# Patient Record
Sex: MALE | Race: WHITE | NOT HISPANIC OR LATINO | URBAN - METROPOLITAN AREA
[De-identification: names, ages, dates, MRNs, and addresses within clinical notes are randomized per-mention and may not be internally consistent; named-entity substitution may affect disease eponyms.]

---

## 2015-12-14 LAB — CRC RECOMMENDATION EXT: NORMAL

## 2022-04-07 ENCOUNTER — HISTORICAL (OUTPATIENT)
Dept: ADMINISTRATIVE | Facility: HOSPITAL | Age: 57
End: 2022-04-07

## 2022-04-24 VITALS
OXYGEN SATURATION: 98 % | HEIGHT: 70 IN | DIASTOLIC BLOOD PRESSURE: 68 MMHG | WEIGHT: 222.88 LBS | BODY MASS INDEX: 31.91 KG/M2 | SYSTOLIC BLOOD PRESSURE: 107 MMHG

## 2023-11-14 PROBLEM — F41.9 ANXIETY: Status: ACTIVE | Noted: 2023-11-14

## 2023-11-14 PROBLEM — I10 HYPERTENSION: Status: ACTIVE | Noted: 2023-11-14

## 2023-11-14 PROBLEM — I10 PRIMARY HYPERTENSION: Chronic | Status: ACTIVE | Noted: 2023-11-14

## 2023-11-14 PROBLEM — R73.03 PREDIABETES: Chronic | Status: ACTIVE | Noted: 2023-11-14

## 2023-11-14 PROBLEM — E66.9 OBESITY: Chronic | Status: ACTIVE | Noted: 2023-11-14

## 2023-11-14 PROBLEM — J30.9 CHRONIC ALLERGIC RHINITIS: Status: ACTIVE | Noted: 2017-10-26

## 2023-11-14 PROBLEM — E78.5 HYPERLIPIDEMIA: Status: ACTIVE | Noted: 2023-11-14

## 2023-11-14 PROBLEM — E66.9 OBESITY: Status: ACTIVE | Noted: 2023-11-14

## 2023-11-14 PROBLEM — G47.9 SLEEP DISORDER: Status: ACTIVE | Noted: 2023-11-14

## 2023-11-14 PROBLEM — E78.5 DYSLIPIDEMIA: Chronic | Status: ACTIVE | Noted: 2023-11-14

## 2023-11-14 NOTE — PROGRESS NOTES
"Subjective:       Patient ID: Prabhakar Duncan is a 57 y.o. male.    Chief Complaint: Establish Care    Patient presents to the clinic to establish primary care.  Past medical, family, and social history is reviewed, as well as all chronic conditions, and medications prescribed to treat those conditions.        Review of Systems   Constitutional: Negative.  Negative for fatigue and fever.   HENT: Negative.  Negative for sore throat and trouble swallowing.    Eyes: Negative.  Negative for redness and visual disturbance.   Respiratory: Negative.  Negative for chest tightness and shortness of breath.    Cardiovascular: Negative.  Negative for chest pain.   Gastrointestinal: Negative.  Negative for abdominal pain, blood in stool and nausea.   Endocrine: Negative.  Negative for cold intolerance, heat intolerance and polyuria.   Genitourinary: Negative.    Musculoskeletal: Negative.  Negative for arthralgias, gait problem and joint swelling.   Integumentary:  Negative for rash. Negative.   Neurological: Negative.  Negative for dizziness, weakness and headaches.   Psychiatric/Behavioral: Negative.  Negative for agitation and dysphoric mood.            Patient Care Team:  Yohannes Hudson MD as PCP - General (Family Medicine)  Silvestre Morgan MD as Consulting Physician (Otolaryngology)  Objective:   Visit Vitals  /78   Pulse 74   Resp 18   Ht 5' 10" (1.778 m)   Wt 100.7 kg (222 lb)   SpO2 99%   BMI 31.85 kg/m²        Physical Exam  Constitutional:       Appearance: Normal appearance.   HENT:      Head: Normocephalic.      Mouth/Throat:      Mouth: Mucous membranes are moist.      Pharynx: Oropharynx is clear.   Eyes:      Conjunctiva/sclera: Conjunctivae normal.      Pupils: Pupils are equal, round, and reactive to light.   Cardiovascular:      Rate and Rhythm: Normal rate and regular rhythm.      Heart sounds: Normal heart sounds.   Pulmonary:      Effort: Pulmonary effort is normal.      Breath sounds: Normal " breath sounds.   Abdominal:      General: Abdomen is flat.      Palpations: Abdomen is soft.   Musculoskeletal:         General: Normal range of motion.      Cervical back: Neck supple.   Skin:     General: Skin is warm and dry.   Neurological:      General: No focal deficit present.      Mental Status: He is alert and oriented to person, place, and time.   Psychiatric:         Mood and Affect: Mood normal.         Behavior: Behavior normal.         Thought Content: Thought content normal.         Judgment: Judgment normal.         Lab Results   Component Value Date    WBC 6.5 06/13/2023    HGB 14.2 06/13/2023    HCT 42.1 06/13/2023    RDW 12.1 06/13/2023     06/13/2023      Lab Results   Component Value Date    CHOL 138 06/13/2023    TRIG 56 06/13/2023    HDL 48 06/13/2023     Lab Results   Component Value Date    TSH 1.670 06/13/2023     Lab Results   Component Value Date    HGBA1C 6.0 (H) 06/13/2023        Assessment:       ICD-10-CM ICD-9-CM   1. Establishing care with new doctor, encounter for  Z76.89 V65.8   2. Prediabetes  R73.03 790.29   3. Primary hypertension  I10 401.9   4. Dyslipidemia  E78.5 272.4   5. Moderate persistent extrinsic asthma without complication  J45.40 493.00   6. Primary insomnia  F51.01 307.42   7. TUAN (generalized anxiety disorder)  F41.1 300.02   8. Gastroesophageal reflux disease without esophagitis  K21.9 530.81   9. Class 1 obesity due to excess calories with serious comorbidity and body mass index (BMI) of 31.0 to 31.9 in adult  E66.09 278.00    Z68.31 V85.31   10. Wellness examination  Z00.00 V70.0   11. Screening for prostate cancer  Z12.5 V76.44       Current Outpatient Medications   Medication Instructions    amlodipine-benazepril 10-20mg (LOTREL) 10-20 mg per capsule 1 capsule, Oral, Daily    azelastine (ASTELIN) 137 mcg (0.1 %) nasal spray 1 spray, Nasal, Daily    cholecalciferol, vitamin D3, (VITAMIN D3) 25 mcg (1,000 unit) capsule 1 capsule, Oral, Every morning     DULoxetine (CYMBALTA) 60 mg, Oral, Daily    EPIPEN 0.3 mg/0.3 mL AtIn Intramuscular, Once    esomeprazole (NEXIUM) 20 mg, Oral, Before breakfast    fexofenadine (ALLEGRA) 180 MG tablet 1 tablet, Oral, Daily    lovastatin (MEVACOR) 20 mg, Oral, Daily    multivit-minerals/folic acid (CENTRUM ADULT 50 PLUS ORAL) Oral    SYMBICORT 80-4.5 mcg/actuation HFAA 2 puffs, Inhalation, 2 times daily    traZODone (DESYREL) 100 MG tablet 1 tablet, Oral, Nightly    VENTOLIN HFA 90 mcg/actuation inhaler 2 puffs, Inhalation, Every 6 hours PRN     Plan:     Problem List Items Addressed This Visit          Psychiatric    TUAN (generalized anxiety disorder) (Chronic)    Overview     Prescribed Cymbalta 60 mg daily.         Current Assessment & Plan     Patient reports stability of moods, and effectiveness of medications, including no agitation, significant somnolence, or significant bouts of anxiety or depression.  Patient is not having any sleep disturbance.  Current treatment plan will continue, with plans to discuss any significant changes in patient's status if necessary if anything changes in the future.            Pulmonary    Extrinsic asthma without complication (Chronic)    Overview     Prescribed Symbicort 80-4.5 mcg two puffs b.i.d. and albuterol MDI.         Current Assessment & Plan     This medical condition is currently stable on prescription medication, continue current treatment plan.            Cardiac/Vascular    Primary hypertension (Chronic)    Overview     Prescribed amlodipine benazepril 10-20 mg daily.         Current Assessment & Plan     Blood pressures appear to be adequately controlled with current treatment plan, including prescription blood pressure medication. Continue medical management and continue home blood pressure checks.  Blood pressure should be checked as discussed during medical visits, and average blood pressure should be no greater than 130/80.         Dyslipidemia (Chronic)    Overview      "Prescribed lovastatin 20 mg daily.         Current Assessment & Plan             Component Ref Range & Units 5 mo ago  (6/13/23) 11 mo ago  (12/7/22) 2 yr ago  (7/28/21) 2 yr ago  (1/27/21)   Cholesterol 100 - 199 mg/dL 138 163 149 135   Triglycerides 0 - 149 mg/dL 56 102 91 69   HDL >39 mg/dL 48 45 46 52   VLDL Cholesterol Jeffrey 5 - 40 mg/dL 12 19 17 14   LDL Calculated 0 - 99 mg/dL 78 99 86 69      Most recent cholesterol/lipid blood testing shows adequate control, continue current treatment plan, including prescription medications if prescribed, and/or diet high in fiber, low in saturated fats as discussed during clinic visit.            Endocrine    Class 1 obesity due to excess calories with serious comorbidity and body mass index (BMI) of 31.0 to 31.9 in adult (Chronic)    Overview     Weight loss, healthy dietary choices, increased activity/exercise are recommended.  To lose weight, it is generally recommended to restrict calories to approximately 1500 calories per day to lose 1 lb per week, and approximately 1200 calories per day to lose up to 2 lb per week.  Generally, this is a healthy amount of weight to lose, and an appropriate amount of time to lose this amount of weight.  Adding exercise will assist in losing weight, particularly aerobic exercise such as walking.  However, resistance training, or lifting weights" over time may assistant weight loss by increasing basal metabolic rate, or the rate at which your body burns calories during periods of inactivity.    Keep track of BMI (body mass index), below 25 is considered normal, over 25 but below 30 is considered overweight, anything over 30 is considered moderately obese, over 35 severely obese, and over 40 is characterized as morbidly obese.         Prediabetes (Chronic)    Overview     Not prescribed any medication for prediabetes.         Current Assessment & Plan     Most recent A1c June 2023, 6.0%.    Current average blood sugar falls into a range " that is determined to be prediabetes.  In other words, if your blood sugar goes any higher, you could become diabetic.  Therefore, it is essential that we start a treatment plan that decreases your average blood sugar.  Follow a healthy meal plan.  This includes eating lean proteins, complex carbohydrates in moderation (rice, bread, pasta, potatoes), fresh fruits and vegetables, low-fat dairy products and healthy fats such as avocado, olive, canola or vegetable oil.  Simple carbohydrates such as sweets, containing sugar should be avoided or consumed in controlled amounts.    Physical activity as recommended, 30 more minutes up to 5 days a week.  This could be something as simple as brisk walking or biking.    Weight loss is also beneficial and may reverse diabetes or prediabetes.            GI    Gastroesophageal reflux disease without esophagitis (Chronic)    Overview     Prescribed Nexium 40 mg daily.         Current Assessment & Plan     Symptoms of GERD have been modified and controlled using a PPI medication.  Patient was advised to continue reflux precautions as discussed in the past, and at some point would be prudent to discuss possible decreased frequency of PPI use or cessation of PPI use altogether.   It is hopeful at some point that the patient can use the medication on an as-needed basis.            Other    Primary insomnia (Chronic)    Overview     Prescribed trazodone 100 mg q.h.s..         Current Assessment & Plan     This medical condition is currently stable on prescription medication, continue current treatment plan.          Other Visit Diagnoses       Establishing care with new doctor, encounter for    -  Primary    Patient's medical care has been established in this clinic.  All issues addressed, all questions answered,  with appropriate scheduling of follow-up care.    Wellness examination        This diagnosis does not apply to this visit, wellness exam with pre visit labs will be  scheduled/ordered for future visit.    Relevant Orders    Comprehensive Metabolic Panel    CBC Auto Differential    Lipid Panel    Hemoglobin A1C    TSH    Hepatitis C Antibody    PSA, Screening    Screening for prostate cancer        This diagnosis does not apply to this visit, appropriate prostate cancer screening will be ordered for next visit/wellness exam.    Relevant Orders    PSA, Screening                    Follow up in about 7 months (around 6/17/2024) for Wellness.

## 2023-11-15 ENCOUNTER — TELEPHONE (OUTPATIENT)
Dept: PRIMARY CARE CLINIC | Facility: CLINIC | Age: 58
End: 2023-11-15

## 2023-11-15 ENCOUNTER — OFFICE VISIT (OUTPATIENT)
Dept: PRIMARY CARE CLINIC | Facility: CLINIC | Age: 58
End: 2023-11-15
Payer: COMMERCIAL

## 2023-11-15 VITALS
HEART RATE: 74 BPM | HEIGHT: 70 IN | SYSTOLIC BLOOD PRESSURE: 130 MMHG | OXYGEN SATURATION: 99 % | WEIGHT: 222 LBS | DIASTOLIC BLOOD PRESSURE: 78 MMHG | RESPIRATION RATE: 18 BRPM | BODY MASS INDEX: 31.78 KG/M2

## 2023-11-15 DIAGNOSIS — Z12.5 SCREENING FOR PROSTATE CANCER: ICD-10-CM

## 2023-11-15 DIAGNOSIS — Z76.89 ESTABLISHING CARE WITH NEW DOCTOR, ENCOUNTER FOR: Primary | ICD-10-CM

## 2023-11-15 DIAGNOSIS — E78.5 DYSLIPIDEMIA: Chronic | ICD-10-CM

## 2023-11-15 DIAGNOSIS — I10 PRIMARY HYPERTENSION: Chronic | ICD-10-CM

## 2023-11-15 DIAGNOSIS — F41.1 GAD (GENERALIZED ANXIETY DISORDER): Chronic | ICD-10-CM

## 2023-11-15 DIAGNOSIS — E66.09 CLASS 1 OBESITY DUE TO EXCESS CALORIES WITH SERIOUS COMORBIDITY AND BODY MASS INDEX (BMI) OF 31.0 TO 31.9 IN ADULT: Chronic | ICD-10-CM

## 2023-11-15 DIAGNOSIS — R73.03 PREDIABETES: Chronic | ICD-10-CM

## 2023-11-15 DIAGNOSIS — K21.9 GASTROESOPHAGEAL REFLUX DISEASE WITHOUT ESOPHAGITIS: Chronic | ICD-10-CM

## 2023-11-15 DIAGNOSIS — F51.01 PRIMARY INSOMNIA: ICD-10-CM

## 2023-11-15 DIAGNOSIS — F51.01 PRIMARY INSOMNIA: Chronic | ICD-10-CM

## 2023-11-15 DIAGNOSIS — J45.40 MODERATE PERSISTENT EXTRINSIC ASTHMA WITHOUT COMPLICATION: Chronic | ICD-10-CM

## 2023-11-15 DIAGNOSIS — E78.5 DYSLIPIDEMIA: Primary | Chronic | ICD-10-CM

## 2023-11-15 DIAGNOSIS — Z00.00 WELLNESS EXAMINATION: ICD-10-CM

## 2023-11-15 PROBLEM — J45.909 EXTRINSIC ASTHMA WITHOUT COMPLICATION: Chronic | Status: ACTIVE | Noted: 2023-11-15

## 2023-11-15 PROBLEM — E66.811 CLASS 1 OBESITY DUE TO EXCESS CALORIES WITH SERIOUS COMORBIDITY AND BODY MASS INDEX (BMI) OF 31.0 TO 31.9 IN ADULT: Chronic | Status: ACTIVE | Noted: 2023-11-14

## 2023-11-15 PROCEDURE — 99204 OFFICE O/P NEW MOD 45 MIN: CPT | Mod: ,,, | Performed by: GENERAL PRACTICE

## 2023-11-15 PROCEDURE — 99204 PR OFFICE/OUTPT VISIT, NEW, LEVL IV, 45-59 MIN: ICD-10-PCS | Mod: ,,, | Performed by: GENERAL PRACTICE

## 2023-11-15 RX ORDER — MINERAL OIL
1 ENEMA (ML) RECTAL DAILY
COMMUNITY
Start: 2023-01-27

## 2023-11-15 RX ORDER — DULOXETIN HYDROCHLORIDE 60 MG/1
60 CAPSULE, DELAYED RELEASE ORAL DAILY
COMMUNITY
End: 2023-11-16 | Stop reason: SDUPTHER

## 2023-11-15 RX ORDER — VIT C/E/ZN/COPPR/LUTEIN/ZEAXAN 250MG-90MG
1 CAPSULE ORAL EVERY MORNING
COMMUNITY
Start: 2023-06-15 | End: 2024-06-14

## 2023-11-15 RX ORDER — HYDROGEN PEROXIDE 3 %
20 SOLUTION, NON-ORAL MISCELLANEOUS
COMMUNITY

## 2023-11-15 RX ORDER — AMLODIPINE AND BENAZEPRIL HYDROCHLORIDE 10; 20 MG/1; MG/1
1 CAPSULE ORAL DAILY
COMMUNITY
Start: 2023-10-26 | End: 2023-11-15 | Stop reason: SDUPTHER

## 2023-11-15 RX ORDER — HYDROGEN PEROXIDE 3 %
20 SOLUTION, NON-ORAL MISCELLANEOUS
Qty: 90 CAPSULE | Refills: 3 | Status: CANCELLED | OUTPATIENT
Start: 2023-11-15

## 2023-11-15 RX ORDER — AZELASTINE 1 MG/ML
1 SPRAY, METERED NASAL DAILY
COMMUNITY
Start: 2023-08-17

## 2023-11-15 RX ORDER — EPINEPHRINE 0.3 MG/.3ML
INJECTION INTRAMUSCULAR ONCE
COMMUNITY
Start: 2023-07-26

## 2023-11-15 RX ORDER — ALBUTEROL SULFATE 90 UG/1
2 AEROSOL, METERED RESPIRATORY (INHALATION) EVERY 6 HOURS PRN
COMMUNITY

## 2023-11-15 RX ORDER — LOVASTATIN 20 MG/1
20 TABLET ORAL DAILY
COMMUNITY
End: 2023-11-16 | Stop reason: SDUPTHER

## 2023-11-15 RX ORDER — TRAZODONE HYDROCHLORIDE 100 MG/1
1 TABLET ORAL NIGHTLY
COMMUNITY
Start: 2023-10-30 | End: 2023-11-15 | Stop reason: SDUPTHER

## 2023-11-15 RX ORDER — BUDESONIDE AND FORMOTEROL FUMARATE DIHYDRATE 80; 4.5 UG/1; UG/1
2 AEROSOL RESPIRATORY (INHALATION) 2 TIMES DAILY
COMMUNITY
Start: 2023-07-26

## 2023-11-15 NOTE — ASSESSMENT & PLAN NOTE
Most recent A1c June 2023, 6.0%.    Current average blood sugar falls into a range that is determined to be prediabetes.  In other words, if your blood sugar goes any higher, you could become diabetic.  Therefore, it is essential that we start a treatment plan that decreases your average blood sugar.  Follow a healthy meal plan.  This includes eating lean proteins, complex carbohydrates in moderation (rice, bread, pasta, potatoes), fresh fruits and vegetables, low-fat dairy products and healthy fats such as avocado, olive, canola or vegetable oil.  Simple carbohydrates such as sweets, containing sugar should be avoided or consumed in controlled amounts.    Physical activity as recommended, 30 more minutes up to 5 days a week.  This could be something as simple as brisk walking or biking.    Weight loss is also beneficial and may reverse diabetes or prediabetes.

## 2023-11-15 NOTE — ASSESSMENT & PLAN NOTE
Symptoms of GERD have been modified and controlled using a PPI medication.  Patient was advised to continue reflux precautions as discussed in the past, and at some point would be prudent to discuss possible decreased frequency of PPI use or cessation of PPI use altogether.   It is hopeful at some point that the patient can use the medication on an as-needed basis.

## 2023-11-15 NOTE — LETTER
AUTHORIZATION FOR RELEASE OF   CONFIDENTIAL INFORMATION    Dear [facility],    We are seeing Prabhakar Duncan, date of birth 1965, in the clinic at [facility]. Yohannes Hudson MD is the patient's PCP. Prabhakar Duncan has an outstanding lab/procedure at the time we reviewed his chart. In order to help keep his health information updated, he has authorized us to request the following medical record(s):        (  )  MAMMOGRAM                                      (X  )  COLONOSCOPY      (  )  PAP SMEAR                                          (  )  OUTSIDE LAB RESULTS     (  )  DEXA SCAN                                          (  )  EYE EXAM            (  )  FOOT EXAM                                          (  )  ENTIRE RECORD     (  )  OUTSIDE IMMUNIZATIONS                 (  )  _______________         Please fax records to [facility], [place], [place], [place], [phone]     If you have any questions, please contact Glenna Gan LPN at [phone].           Patient Name: Prabhakar Duncan  : 1965  Patient Phone #: [phone]

## 2023-11-15 NOTE — ASSESSMENT & PLAN NOTE
Component Ref Range & Units 5 mo ago  (6/13/23) 11 mo ago  (12/7/22) 2 yr ago  (7/28/21) 2 yr ago  (1/27/21)   Cholesterol 100 - 199 mg/dL 138 163 149 135   Triglycerides 0 - 149 mg/dL 56 102 91 69   HDL >39 mg/dL 48 45 46 52   VLDL Cholesterol Jeffrey 5 - 40 mg/dL 12 19 17 14   LDL Calculated 0 - 99 mg/dL 78 99 86 69        Most recent cholesterol/lipid blood testing shows adequate control, continue current treatment plan, including prescription medications if prescribed, and/or diet high in fiber, low in saturated fats as discussed during clinic visit.

## 2023-11-15 NOTE — TELEPHONE ENCOUNTER
Pt stated all medications that Dr Hudson prescribes him needs to be refilled. Pt stated wife will be here for appt 11/16 and you can give scripts to her.

## 2023-11-16 RX ORDER — DULOXETIN HYDROCHLORIDE 60 MG/1
60 CAPSULE, DELAYED RELEASE ORAL DAILY
Qty: 90 CAPSULE | Refills: 3 | Status: SHIPPED | OUTPATIENT
Start: 2023-11-16 | End: 2024-11-15

## 2023-11-16 RX ORDER — AMLODIPINE AND BENAZEPRIL HYDROCHLORIDE 10; 20 MG/1; MG/1
1 CAPSULE ORAL DAILY
Qty: 90 CAPSULE | Refills: 3 | Status: SHIPPED | OUTPATIENT
Start: 2023-11-16 | End: 2024-11-15

## 2023-11-16 RX ORDER — TRAZODONE HYDROCHLORIDE 100 MG/1
100 TABLET ORAL NIGHTLY
Qty: 90 TABLET | Refills: 3 | Status: SHIPPED | OUTPATIENT
Start: 2023-11-16 | End: 2024-11-15

## 2023-11-16 RX ORDER — LOVASTATIN 20 MG/1
20 TABLET ORAL DAILY
Qty: 90 TABLET | Refills: 3 | Status: SHIPPED | OUTPATIENT
Start: 2023-11-16 | End: 2024-11-15

## 2023-11-17 ENCOUNTER — DOCUMENTATION ONLY (OUTPATIENT)
Dept: PRIMARY CARE CLINIC | Facility: CLINIC | Age: 58
End: 2023-11-17
Payer: COMMERCIAL

## 2024-06-14 ENCOUNTER — CLINICAL SUPPORT (OUTPATIENT)
Dept: PRIMARY CARE CLINIC | Facility: CLINIC | Age: 59
End: 2024-06-14
Payer: COMMERCIAL

## 2024-06-14 DIAGNOSIS — E78.5 DYSLIPIDEMIA: Primary | Chronic | ICD-10-CM

## 2024-06-14 DIAGNOSIS — I10 PRIMARY HYPERTENSION: Chronic | ICD-10-CM

## 2024-06-14 DIAGNOSIS — R73.03 PREDIABETES: Chronic | ICD-10-CM

## 2024-06-14 DIAGNOSIS — Z00.00 WELLNESS EXAMINATION: ICD-10-CM

## 2024-06-14 DIAGNOSIS — Z12.5 SCREENING FOR PROSTATE CANCER: ICD-10-CM

## 2024-06-14 LAB
ALBUMIN SERPL-MCNC: 4 G/DL (ref 3.5–5)
ALBUMIN/GLOB SERPL: 1.4 RATIO (ref 1.1–2)
ALP SERPL-CCNC: 87 UNIT/L (ref 40–150)
ALT SERPL-CCNC: 28 UNIT/L (ref 0–55)
ANION GAP SERPL CALC-SCNC: 7 MEQ/L
AST SERPL-CCNC: 23 UNIT/L (ref 5–34)
BASOPHILS # BLD AUTO: 0.05 X10(3)/MCL
BASOPHILS NFR BLD AUTO: 0.6 %
BILIRUB SERPL-MCNC: 0.5 MG/DL
BUN SERPL-MCNC: 12.7 MG/DL (ref 8.4–25.7)
CALCIUM SERPL-MCNC: 9.7 MG/DL (ref 8.4–10.2)
CHLORIDE SERPL-SCNC: 106 MMOL/L (ref 98–107)
CHOLEST SERPL-MCNC: 158 MG/DL
CHOLEST/HDLC SERPL: 4 {RATIO} (ref 0–5)
CO2 SERPL-SCNC: 27 MMOL/L (ref 22–29)
CREAT SERPL-MCNC: 1.32 MG/DL (ref 0.73–1.18)
CREAT/UREA NIT SERPL: 10
EOSINOPHIL # BLD AUTO: 0.33 X10(3)/MCL (ref 0–0.9)
EOSINOPHIL NFR BLD AUTO: 4 %
ERYTHROCYTE [DISTWIDTH] IN BLOOD BY AUTOMATED COUNT: 12.3 % (ref 11.5–17)
EST. AVERAGE GLUCOSE BLD GHB EST-MCNC: 116.9 MG/DL
GFR SERPLBLD CREATININE-BSD FMLA CKD-EPI: >60 ML/MIN/1.73/M2
GLOBULIN SER-MCNC: 2.9 GM/DL (ref 2.4–3.5)
GLUCOSE SERPL-MCNC: 128 MG/DL (ref 74–100)
HBA1C MFR BLD: 5.7 %
HCT VFR BLD AUTO: 46 % (ref 42–52)
HDLC SERPL-MCNC: 41 MG/DL (ref 35–60)
HGB BLD-MCNC: 15.2 G/DL (ref 14–18)
IMM GRANULOCYTES # BLD AUTO: 0.02 X10(3)/MCL (ref 0–0.04)
IMM GRANULOCYTES NFR BLD AUTO: 0.2 %
LDLC SERPL CALC-MCNC: 96 MG/DL (ref 50–140)
LYMPHOCYTES # BLD AUTO: 1.23 X10(3)/MCL (ref 0.6–4.6)
LYMPHOCYTES NFR BLD AUTO: 14.8 %
MCH RBC QN AUTO: 31.2 PG (ref 27–31)
MCHC RBC AUTO-ENTMCNC: 33 G/DL (ref 33–36)
MCV RBC AUTO: 94.5 FL (ref 80–94)
MONOCYTES # BLD AUTO: 1.05 X10(3)/MCL (ref 0.1–1.3)
MONOCYTES NFR BLD AUTO: 12.6 %
NEUTROPHILS # BLD AUTO: 5.64 X10(3)/MCL (ref 2.1–9.2)
NEUTROPHILS NFR BLD AUTO: 67.8 %
NRBC BLD AUTO-RTO: 0 %
PLATELET # BLD AUTO: 281 X10(3)/MCL (ref 130–400)
PMV BLD AUTO: 10.6 FL (ref 7.4–10.4)
POTASSIUM SERPL-SCNC: 4.8 MMOL/L (ref 3.5–5.1)
PROT SERPL-MCNC: 6.9 GM/DL (ref 6.4–8.3)
PSA SERPL-MCNC: 0.96 NG/ML
RBC # BLD AUTO: 4.87 X10(6)/MCL (ref 4.7–6.1)
SODIUM SERPL-SCNC: 140 MMOL/L (ref 136–145)
TRIGL SERPL-MCNC: 103 MG/DL (ref 34–140)
TSH SERPL-ACNC: 1.45 UIU/ML (ref 0.35–4.94)
VLDLC SERPL CALC-MCNC: 21 MG/DL
WBC # BLD AUTO: 8.32 X10(3)/MCL (ref 4.5–11.5)

## 2024-06-14 PROCEDURE — 80053 COMPREHEN METABOLIC PANEL: CPT | Performed by: GENERAL PRACTICE

## 2024-06-14 PROCEDURE — 80061 LIPID PANEL: CPT | Performed by: GENERAL PRACTICE

## 2024-06-14 PROCEDURE — 36415 COLL VENOUS BLD VENIPUNCTURE: CPT | Mod: ,,, | Performed by: GENERAL PRACTICE

## 2024-06-14 PROCEDURE — 86803 HEPATITIS C AB TEST: CPT | Performed by: GENERAL PRACTICE

## 2024-06-14 PROCEDURE — 36415 COLL VENOUS BLD VENIPUNCTURE: CPT

## 2024-06-14 PROCEDURE — 85025 COMPLETE CBC W/AUTO DIFF WBC: CPT | Performed by: GENERAL PRACTICE

## 2024-06-14 PROCEDURE — 84443 ASSAY THYROID STIM HORMONE: CPT | Performed by: GENERAL PRACTICE

## 2024-06-14 PROCEDURE — 83036 HEMOGLOBIN GLYCOSYLATED A1C: CPT | Performed by: GENERAL PRACTICE

## 2024-06-14 PROCEDURE — 84153 ASSAY OF PSA TOTAL: CPT | Performed by: GENERAL PRACTICE

## 2024-06-14 NOTE — PROGRESS NOTES
Patient presented today for an Nurse Visit to have blood work drawn. One attempted draw to right arm with an 22 gauge needle, pt tolerated well with no complaints.

## 2024-06-15 LAB — HCV AB SERPL QL IA: NONREACTIVE

## 2024-06-17 ENCOUNTER — OFFICE VISIT (OUTPATIENT)
Dept: PRIMARY CARE CLINIC | Facility: CLINIC | Age: 59
End: 2024-06-17
Payer: COMMERCIAL

## 2024-06-17 VITALS
HEIGHT: 70 IN | RESPIRATION RATE: 18 BRPM | WEIGHT: 236 LBS | OXYGEN SATURATION: 99 % | SYSTOLIC BLOOD PRESSURE: 127 MMHG | DIASTOLIC BLOOD PRESSURE: 76 MMHG | HEART RATE: 88 BPM | BODY MASS INDEX: 33.79 KG/M2

## 2024-06-17 DIAGNOSIS — J30.9 CHRONIC ALLERGIC RHINITIS: ICD-10-CM

## 2024-06-17 DIAGNOSIS — Z00.00 WELLNESS EXAMINATION: Primary | ICD-10-CM

## 2024-06-17 DIAGNOSIS — J45.40 MODERATE PERSISTENT EXTRINSIC ASTHMA WITHOUT COMPLICATION: Chronic | ICD-10-CM

## 2024-06-17 DIAGNOSIS — E78.5 DYSLIPIDEMIA: Chronic | ICD-10-CM

## 2024-06-17 DIAGNOSIS — I10 PRIMARY HYPERTENSION: Chronic | ICD-10-CM

## 2024-06-17 DIAGNOSIS — E66.09 CLASS 1 OBESITY DUE TO EXCESS CALORIES WITH SERIOUS COMORBIDITY AND BODY MASS INDEX (BMI) OF 33.0 TO 33.9 IN ADULT: Chronic | ICD-10-CM

## 2024-06-17 DIAGNOSIS — Z12.5 SCREENING FOR PROSTATE CANCER: ICD-10-CM

## 2024-06-17 DIAGNOSIS — F41.1 GAD (GENERALIZED ANXIETY DISORDER): Chronic | ICD-10-CM

## 2024-06-17 DIAGNOSIS — K21.9 GASTROESOPHAGEAL REFLUX DISEASE WITHOUT ESOPHAGITIS: Chronic | ICD-10-CM

## 2024-06-17 DIAGNOSIS — R73.03 PREDIABETES: Chronic | ICD-10-CM

## 2024-06-17 DIAGNOSIS — F51.01 PRIMARY INSOMNIA: Chronic | ICD-10-CM

## 2024-06-17 PROCEDURE — 3008F BODY MASS INDEX DOCD: CPT | Mod: CPTII,,, | Performed by: GENERAL PRACTICE

## 2024-06-17 PROCEDURE — 3074F SYST BP LT 130 MM HG: CPT | Mod: CPTII,,, | Performed by: GENERAL PRACTICE

## 2024-06-17 PROCEDURE — 1159F MED LIST DOCD IN RCRD: CPT | Mod: CPTII,,, | Performed by: GENERAL PRACTICE

## 2024-06-17 PROCEDURE — 3078F DIAST BP <80 MM HG: CPT | Mod: CPTII,,, | Performed by: GENERAL PRACTICE

## 2024-06-17 PROCEDURE — 3044F HG A1C LEVEL LT 7.0%: CPT | Mod: CPTII,,, | Performed by: GENERAL PRACTICE

## 2024-06-17 PROCEDURE — 1160F RVW MEDS BY RX/DR IN RCRD: CPT | Mod: CPTII,,, | Performed by: GENERAL PRACTICE

## 2024-06-17 PROCEDURE — 99396 PREV VISIT EST AGE 40-64: CPT | Mod: ,,, | Performed by: GENERAL PRACTICE

## 2024-06-17 PROCEDURE — 4010F ACE/ARB THERAPY RXD/TAKEN: CPT | Mod: CPTII,,, | Performed by: GENERAL PRACTICE

## 2024-06-17 NOTE — PROGRESS NOTES
"Subjective:       Patient ID: Prabhakar Dnucan is a 58 y.o. male.    Chief Complaint: Annual Exam    Patient presents to the clinic today for wellness examination.  Current and past medical history reviewed  Pertinent family and social history reviewed    Colorectal cancer screening:  CRC screening reviewed  Prostate CA screening:  Prostate CA screening reviewed  Vaccinations due:  All vaccinations due and/or desired have been addressed and given.    No complaints today.        Review of Systems   Constitutional: Negative.  Negative for fatigue and fever.   HENT: Negative.  Negative for sore throat and trouble swallowing.    Eyes: Negative.  Negative for redness and visual disturbance.   Respiratory: Negative.  Negative for chest tightness and shortness of breath.    Cardiovascular: Negative.  Negative for chest pain.   Gastrointestinal: Negative.  Negative for abdominal pain, blood in stool and nausea.   Endocrine: Negative.  Negative for cold intolerance, heat intolerance and polyuria.   Genitourinary: Negative.    Musculoskeletal: Negative.  Negative for arthralgias, gait problem and joint swelling.   Integumentary:  Negative for rash. Negative.   Neurological: Negative.  Negative for dizziness, weakness and headaches.   Psychiatric/Behavioral: Negative.  Negative for agitation and dysphoric mood.            Patient Care Team:  Yohannes Hudson MD as PCP - General (Family Medicine)  Silvestre Morgan MD as Consulting Physician (Otolaryngology)  [facility], [facility] (Gastroenterology)  Objective:   Visit Vitals  /76   Pulse 88   Resp 18   Ht 5' 10" (1.778 m)   Wt 107 kg (236 lb)   SpO2 99%   BMI 33.86 kg/m²        Physical Exam  Constitutional:       Appearance: He is obese.   HENT:      Head: Normocephalic.      Mouth/Throat:      Mouth: Mucous membranes are moist.      Pharynx: Oropharynx is clear.   Eyes:      Pupils: Pupils are equal, round, and reactive to light.   Cardiovascular:    "   Rate and Rhythm: Normal rate and regular rhythm.   Pulmonary:      Effort: Pulmonary effort is normal.      Breath sounds: Normal breath sounds.   Abdominal:      Palpations: Abdomen is soft.   Musculoskeletal:         General: Normal range of motion.   Skin:     General: Skin is warm and dry.   Neurological:      General: No focal deficit present.      Mental Status: He is alert.   Psychiatric:         Mood and Affect: Mood normal.         Behavior: Behavior normal.         Thought Content: Thought content normal.         Judgment: Judgment normal.           Lab Results   Component Value Date     06/14/2024    K 4.8 06/14/2024     06/14/2024    CO2 27 06/14/2024    BUN 12.7 06/14/2024    CREATININE 1.32 (H) 06/14/2024    CALCIUM 9.7 06/14/2024    ALBUMIN 4.0 06/14/2024    BILITOT 0.5 06/14/2024    ALKPHOS 87 06/14/2024    AST 23 06/14/2024    ALT 28 06/14/2024    EGFRNORACEVR >60 06/14/2024     Lab Results   Component Value Date    WBC 8.32 06/14/2024    RBC 4.87 06/14/2024    HGB 15.2 06/14/2024    HCT 46.0 06/14/2024    MCV 94.5 (H) 06/14/2024    RDW 12.3 06/14/2024     06/14/2024      Lab Results   Component Value Date    CHOL 158 06/14/2024    TRIG 103 06/14/2024    HDL 41 06/14/2024    LDL 96.00 06/14/2024    TOTALCHOLEST 4 06/14/2024     Lab Results   Component Value Date    TSH 1.450 06/14/2024     Lab Results   Component Value Date    HGBA1C 5.7 06/14/2024        Lab Results   Component Value Date    PSA 0.96 06/14/2024         Assessment:       ICD-10-CM ICD-9-CM   1. Wellness examination  Z00.00 V70.0   2. Screening for prostate cancer  Z12.5 V76.44   3. Primary hypertension  I10 401.9   4. Prediabetes  R73.03 790.29   5. Dyslipidemia  E78.5 272.4   6. TUAN (generalized anxiety disorder)  F41.1 300.02   7. Chronic allergic rhinitis  J30.9 477.9   8. Moderate persistent extrinsic asthma without complication  J45.40 493.00   9. Gastroesophageal reflux disease without esophagitis  K21.9  530.81   10. Primary insomnia  F51.01 307.42   11. Class 1 obesity due to excess calories with serious comorbidity and body mass index (BMI) of 33.0 to 33.9 in adult  E66.09 278.00    Z68.33 V85.33       Current Outpatient Medications   Medication Instructions    amlodipine-benazepril 10-20mg (LOTREL) 10-20 mg per capsule 1 capsule, Oral, Daily    azelastine (ASTELIN) 137 mcg (0.1 %) nasal spray 1 spray, Nasal, Daily    DULoxetine (CYMBALTA) 60 mg, Oral, Daily    EPIPEN 0.3 mg/0.3 mL AtIn Intramuscular, Once    esomeprazole (NEXIUM) 20 mg, Oral, Before breakfast    fexofenadine (ALLEGRA) 180 MG tablet 1 tablet, Oral, Daily    lovastatin (MEVACOR) 20 mg, Oral, Daily    multivit-minerals/folic acid (CENTRUM ADULT 50 PLUS ORAL) Oral    SYMBICORT 80-4.5 mcg/actuation HFAA 2 puffs, Inhalation, 2 times daily    traZODone (DESYREL) 100 mg, Oral, Nightly    VENTOLIN HFA 90 mcg/actuation inhaler 2 puffs, Inhalation, Every 6 hours PRN     Plan:     Problem List Items Addressed This Visit          Psychiatric    TUAN (generalized anxiety disorder) (Chronic)    Overview     Prescribed Cymbalta 60 mg daily.    Patient reports stability of moods, and effectiveness of medications, including no agitation, significant somnolence, or significant bouts of anxiety or depression.  Patient is not having any sleep disturbance.  Current treatment plan will continue, with plans to discuss any significant changes in patient's status if necessary if anything changes in the future..    Medication will be continued at current dosage.            ENT    Chronic allergic rhinitis    Overview     Prescribed Astelin nasal spray, takes Allegra.    Current condition is controlled on prescription medication, continue current treatment plan.    Medication will be continued at current dosage.            Pulmonary    Extrinsic asthma without complication (Chronic)    Overview     Prescribed Symbicort 80-4.5 mcg two puffs b.i.d. and albuterol  MDI.    Patient is being followed and treated for asthma, being prescribed medications for use related to this medical condition.  Patient does not report any significant worsening of the severity or duration of asthmatic events.    Patient advised to continue current medication(s) at current dose..            Cardiac/Vascular    Primary hypertension (Chronic)    Overview     Prescribed amlodipine benazepril 10-20 mg daily.    Blood pressures appear to be adequately controlled with current treatment plan, including prescription blood pressure medication.  Medication(s) appear to be effective at current dosages, and are not causing any side effects or problems.  Continue medical management and continue home blood pressure checks.  Average blood pressures at home should be no greater than 130/80.  Patient instructed to contact the clinic if blood pressures become elevated at any point prior to next clinic visit.    Medication will be continued at the current dosage.         Dyslipidemia (Chronic)    Overview     Prescribed lovastatin 20 mg daily.    Most recent cholesterol/lipid blood testing shows adequate control, continue current treatment plan, including prescription medications if prescribed, and/or diet high in fiber, low in saturated fats as discussed during clinic visit.            Endocrine    Class 1 obesity due to excess calories with serious comorbidity and body mass index (BMI) of 33.0 to 33.9 in adult (Chronic)    Overview     Weight loss, healthy dietary choices, increased activity/exercise are recommended.  To lose weight, it is generally recommended to restrict calories to approximately 1500 calories per day to lose 1 lb per week, and approximately 1200 calories per day to lose up to 2 lb per week.  Generally, this is a healthy amount of weight to lose, and an appropriate amount of time to lose this amount of weight.  Adding exercise will assist in losing weight, particularly aerobic exercise such as  "walking.  However, resistance training, or lifting weights" over time may assistant weight loss by increasing basal metabolic rate, or the rate at which your body burns calories during periods of inactivity.    Keep track of BMI (body mass index), below 25 is considered normal, over 25 but below 30 is considered overweight, anything over 30 is considered moderately obese, over 35 severely obese, and over 40 is characterized as morbidly obese.         Prediabetes (Chronic)    Overview     Patient has prediabetes and is not prescribed medication.  Patient's prediabetes is treated and controlled adequately using conservative means, specifically lifestyle modification, dietary changes, and/or increase in activity/exercise.         Current Assessment & Plan                Component Ref Range & Units 2 d ago  (6/14/24) 1 yr ago  (6/13/23) 1 yr ago  (12/7/22) 2 yr ago  (7/28/21) 2 yr ago  (7/28/21)   Hemoglobin A1c <=7.0 % 5.7 6.0 High  R, CM 6.0 High  R, CM 5.6 R 5.6 R   Estimated Average Glucose mg/dL 116.9                      GI    Gastroesophageal reflux disease without esophagitis (Chronic)    Overview     Prescribed Nexium 40 mg daily.    Symptoms of GERD have been modified and controlled using a PPI medication.  Patient was advised to continue reflux precautions as discussed in the past, and at some point would be prudent to discuss possible decreased frequency of PPI use or cessation of PPI use altogether.   It is hopeful at some point that the patient can use the medication on an as-needed basis.            Other    Primary insomnia (Chronic)    Overview     Prescribed trazodone 100 mg q.h.s..    This medical condition is currently stable on prescription medication, continue current treatment plan.          Other Visit Diagnoses       Wellness examination    -  Primary    Overall health status was reviewed.  Good health habits reinforced.  Annual wellness exams recommended.    Screening for prostate cancer     "    Prostate specific antigen blood test obtained was essentially normal, suggesting that there is no current concern for prostate cancer.  Digital exam deferred.                    Follow up in about 6 months (around 12/17/2024) for Extended chronic condition F/up.

## 2024-06-17 NOTE — ASSESSMENT & PLAN NOTE
Component Ref Range & Units 2 d ago  (6/14/24) 1 yr ago  (6/13/23) 1 yr ago  (12/7/22) 2 yr ago  (7/28/21) 2 yr ago  (7/28/21)   Hemoglobin A1c <=7.0 % 5.7 6.0 High  R, CM 6.0 High  R, CM 5.6 R 5.6 R   Estimated Average Glucose mg/dL 116.9

## 2024-07-29 ENCOUNTER — TELEPHONE (OUTPATIENT)
Dept: PRIMARY CARE CLINIC | Facility: CLINIC | Age: 59
End: 2024-07-29
Payer: COMMERCIAL

## 2024-07-29 DIAGNOSIS — K21.9 GASTROESOPHAGEAL REFLUX DISEASE WITHOUT ESOPHAGITIS: Primary | ICD-10-CM

## 2024-07-29 RX ORDER — HYDROGEN PEROXIDE 3 %
20 SOLUTION, NON-ORAL MISCELLANEOUS
Qty: 90 CAPSULE | Refills: 3 | Status: SHIPPED | OUTPATIENT
Start: 2024-07-29

## 2024-07-29 NOTE — TELEPHONE ENCOUNTER
----- Message from Nirali Sam sent at 7/29/2024  3:11 PM CDT -----  Regarding: refill  Who Called: Prabhakar Duncan    Refill or New Rx:Refill/New    RX Name and Strength:esomeprazole (NEXIUM) 20 MG capsule - -  - --  Sig - Route: Take 20 mg by mouth before breakfast. - Oral      How is the patient currently taking it? (ex. 1XDay):    Is this a 30 day or 90 day RX:    Local or Mail Order:local    List of preferred pharmacies on file (remove unneeded): [id number] - [place], [place] - [address] [place] [address]   Phone: [phone]  Fax: [phone]        Ordering Provider:        Preferred Method of Contact: Phone Call  Patient's Preferred Phone Number on File: [phone]   Best Call Back Number, if different:  Additional Information: refill request; please advise.

## 2024-08-08 ENCOUNTER — TELEPHONE (OUTPATIENT)
Dept: PRIMARY CARE CLINIC | Facility: CLINIC | Age: 59
End: 2024-08-08
Payer: COMMERCIAL

## 2024-08-08 DIAGNOSIS — F51.01 PRIMARY INSOMNIA: Chronic | ICD-10-CM

## 2024-08-08 RX ORDER — TRAZODONE HYDROCHLORIDE 100 MG/1
100 TABLET ORAL NIGHTLY
Qty: 90 TABLET | Refills: 3 | Status: SHIPPED | OUTPATIENT
Start: 2024-08-08 | End: 2025-08-08

## 2024-08-08 RX ORDER — TRAZODONE HYDROCHLORIDE 100 MG/1
100 TABLET ORAL NIGHTLY
Qty: 90 TABLET | Refills: 3 | Status: SHIPPED | OUTPATIENT
Start: 2024-08-08 | End: 2024-08-08 | Stop reason: SDUPTHER

## 2024-12-16 PROBLEM — J30.9 CHRONIC ALLERGIC RHINITIS: Chronic | Status: ACTIVE | Noted: 2017-10-26

## 2024-12-16 NOTE — PROGRESS NOTES
"Subjective:       Patient ID: Prabhakar Duncan is a 59 y.o. male.    Chief Complaint: Hypertension, Anxiety, Hyperlipidemia, and Follow-up    Patient presents to the clinic today for chronic condition follow-up.  Doing well, no specific complaints, tolerating all medications, reporting no significant side effects or problems.    Last wellness exam was 06/17/2024.    Chronic conditions being treated include but are not limited to primary HTN, dyslipidemia, prediabetes, TUAN, GERD, moderate persistent asthma.      Review of Systems   Constitutional: Negative.  Negative for fatigue and fever.   HENT: Negative.  Negative for sore throat and trouble swallowing.    Eyes: Negative.  Negative for redness and visual disturbance.   Respiratory: Negative.  Negative for chest tightness and shortness of breath.    Cardiovascular: Negative.  Negative for chest pain.   Gastrointestinal: Negative.  Negative for abdominal pain, blood in stool and nausea.   Endocrine: Negative.  Negative for cold intolerance, heat intolerance and polyuria.   Genitourinary: Negative.    Musculoskeletal: Negative.  Negative for arthralgias, gait problem and joint swelling.   Integumentary:  Negative for rash. Negative.   Neurological: Negative.  Negative for dizziness, weakness and headaches.   Psychiatric/Behavioral: Negative.  Negative for agitation and dysphoric mood.            Patient Care Team:  Yohannes Hudson MD as PCP - General (Family Medicine)  Silvestre Morgan MD as Consulting Physician (Otolaryngology)  [facility], [facility] (Gastroenterology)  Objective:   Visit Vitals  /79   Pulse 77   Resp 18   Ht 5' 10" (1.778 m)   Wt 108.9 kg (240 lb)   SpO2 98%   BMI 34.44 kg/m²        Physical Exam  Constitutional:       Appearance: He is obese.   HENT:      Head: Normocephalic.      Mouth/Throat:      Mouth: Mucous membranes are moist.      Pharynx: Oropharynx is clear.   Eyes:      Pupils: Pupils are equal, round, and " reactive to light.   Cardiovascular:      Rate and Rhythm: Normal rate and regular rhythm.   Pulmonary:      Effort: Pulmonary effort is normal.      Breath sounds: Normal breath sounds.   Abdominal:      Palpations: Abdomen is soft.   Musculoskeletal:         General: Normal range of motion.   Skin:     General: Skin is warm and dry.   Neurological:      General: No focal deficit present.      Mental Status: He is alert.   Psychiatric:         Mood and Affect: Mood normal.         Behavior: Behavior normal.         Thought Content: Thought content normal.         Judgment: Judgment normal.           Lab Results   Component Value Date     06/14/2024    K 4.8 06/14/2024     06/14/2024    CO2 27 06/14/2024    BUN 12.7 06/14/2024    CREATININE 1.32 (H) 06/14/2024    CALCIUM 9.7 06/14/2024    ALBUMIN 4.0 06/14/2024    BILITOT 0.5 06/14/2024    ALKPHOS 87 06/14/2024    AST 23 06/14/2024    ALT 28 06/14/2024    EGFRNORACEVR >60 06/14/2024     Lab Results   Component Value Date    WBC 8.32 06/14/2024    RBC 4.87 06/14/2024    HGB 15.2 06/14/2024    HCT 46.0 06/14/2024    MCV 94.5 (H) 06/14/2024    RDW 12.3 06/14/2024     06/14/2024      Lab Results   Component Value Date    CHOL 158 06/14/2024    TRIG 103 06/14/2024    HDL 41 06/14/2024    LDL 96.00 06/14/2024    TOTALCHOLEST 4 06/14/2024     Lab Results   Component Value Date    TSH 1.450 06/14/2024     Lab Results   Component Value Date    HGBA1C 5.7 06/14/2024        Lab Results   Component Value Date    PSA 0.96 06/14/2024         Assessment:       ICD-10-CM ICD-9-CM   1. Primary hypertension  I10 401.9   2. Prediabetes  R73.03 790.29   3. Dyslipidemia  E78.5 272.4   4. TUAN (generalized anxiety disorder)  F41.1 300.02   5. Moderate persistent extrinsic asthma without complication  J45.40 493.00   6. Gastroesophageal reflux disease without esophagitis  K21.9 530.81   7. Primary insomnia  F51.01 307.42   8. Chronic allergic rhinitis  J30.9 477.9   9.  Class 1 obesity due to excess calories with serious comorbidity and body mass index (BMI) of 33.0 to 33.9 in adult  E66.811 278.00    E66.09 V85.33    Z68.33    10. Screening for prostate cancer  Z12.5 V76.44       Current Outpatient Medications   Medication Instructions    amlodipine-benazepril 10-20mg (LOTREL) 10-20 mg per capsule 1 capsule, Oral, Daily    azelastine (ASTELIN) 137 mcg (0.1 %) nasal spray 1 spray, Daily    desvenlafaxine succinate (PRISTIQ) 50 mg, Oral, Daily    EPIPEN 0.3 mg/0.3 mL AtIn Once    esomeprazole (NEXIUM) 20 mg, Oral, Before breakfast    fexofenadine (ALLEGRA) 180 MG tablet 1 tablet, Daily    lovastatin (MEVACOR) 20 mg, Oral, Daily    multivit-minerals/folic acid (CENTRUM ADULT 50 PLUS ORAL) Take by mouth.    SYMBICORT 80-4.5 mcg/actuation HFAA 2 puffs, 2 times daily    traZODone (DESYREL) 100 mg, Oral, Nightly    VENTOLIN HFA 90 mcg/actuation inhaler 2 puffs, Every 6 hours PRN     Plan:     Problem List Items Addressed This Visit          Psychiatric    TUAN (generalized anxiety disorder) (Chronic)    Overview     Prescribed Pristiq 50 mg daily.    Switching from Cymbalta to Pristiq on 12/17/2024.    Patient having some breakthrough stress and anxiety, not sure if the Cymbalta is as effective as it once was, we will switch to Pristiq and recheck in about six weeks, January 2025.         Relevant Medications    desvenlafaxine succinate (PRISTIQ) 50 MG Tb24       ENT    Chronic allergic rhinitis (Chronic)    Overview     Prescribed Astelin nasal spray, takes Allegra.    Current condition is controlled on prescription medication, continue current treatment plan.    Medication will be continued at current dosage.            Pulmonary    Extrinsic asthma without complication (Chronic)    Overview     Prescribed Symbicort 80-4.5 mcg two puffs b.i.d. and albuterol MDI.    Patient is being followed and treated for asthma, being prescribed medications for use related to this medical condition.   Patient does not report any significant worsening of the severity or duration of asthmatic events.    Patient advised to continue current medication(s) at current dose..            Cardiac/Vascular    Primary hypertension - Primary (Chronic)    Overview     Prescribed amlodipine benazepril 10-20 mg daily.    Blood pressures appear to be adequately controlled with current treatment plan, including prescription blood pressure medication.  Medication(s) appear to be effective at current dosages, and are not causing any side effects or problems.  Continue medical management and continue home blood pressure checks.  Average blood pressures at home should be no greater than 130/80.  Patient instructed to contact the clinic if blood pressures become elevated at any point prior to next clinic visit.    Medication will be continued at the current dosage.         Relevant Medications    amlodipine-benazepril 10-20mg (LOTREL) 10-20 mg per capsule    Other Relevant Orders    Comprehensive Metabolic Panel    CBC Auto Differential    TSH    Dyslipidemia (Chronic)    Overview     Prescribed lovastatin 20 mg daily.    Most recent cholesterol/lipid blood testing shows adequate control, continue current treatment plan, including prescription medications if prescribed, and/or diet high in fiber, low in saturated fats as discussed during clinic visit.    Medication will be continued at current dosage.         Relevant Medications    lovastatin (MEVACOR) 20 MG tablet    Other Relevant Orders    Lipid Panel       Endocrine    Class 1 obesity due to excess calories with serious comorbidity and body mass index (BMI) of 33.0 to 33.9 in adult (Chronic)    Overview     Weight loss, healthy dietary choices, increased activity/exercise are recommended.  To lose weight, it is generally recommended to restrict calories to approximately 1500 calories per day to lose 1 lb per week, and approximately 1200 calories per day to lose up to 2 lb per  "week.  Generally, this is a healthy amount of weight to lose, and an appropriate amount of time to lose this amount of weight.  Adding exercise will assist in losing weight, particularly aerobic exercise such as walking.  However, resistance training, or lifting weights" over time may assistant weight loss by increasing basal metabolic rate, or the rate at which your body burns calories during periods of inactivity.    Keep track of BMI (body mass index), below 25 is considered normal, over 25 but below 30 is considered overweight, anything over 30 is considered moderately obese, over 35 severely obese, and over 40 is characterized as morbidly obese.         Prediabetes (Chronic)    Overview     Patient has prediabetes and is not prescribed medication.  Patient's prediabetes is treated and controlled adequately using conservative means, specifically lifestyle modification, dietary changes, and/or increase in activity/exercise.         Relevant Orders    Hemoglobin A1C       GI    Gastroesophageal reflux disease without esophagitis (Chronic)    Overview     Prescribed Nexium 40 mg daily.    Symptoms of GERD have been modified and controlled using a PPI medication.  Patient was advised to continue reflux precautions as discussed in the past, and at some point would be prudent to discuss possible decreased frequency of PPI use or cessation of PPI use altogether.   It is hopeful at some point that the patient can use the medication on an as-needed basis.            Other    Primary insomnia (Chronic)    Overview     Prescribed trazodone 100 mg q.h.s..    This medical condition is currently stable on prescription medication, continue current treatment plan.          Other Visit Diagnoses       Screening for prostate cancer        This diagnosis does not apply to this visit, appropriate prostate cancer screening will be ordered for next visit/wellness exam.    Relevant Orders    PSA, Screening                    Follow " up in about 6 months (around 6/18/2025) for Wellness.    This note was created with the assistance of [employer] voice recognition software or phone dictation. There may be transcription errors as a result of using this technology. Effort has been made to assure accuracy of transcription but any obvious errors or omissions should be clarified with the author of the document.

## 2024-12-17 ENCOUNTER — OFFICE VISIT (OUTPATIENT)
Dept: PRIMARY CARE CLINIC | Facility: CLINIC | Age: 59
End: 2024-12-17
Payer: COMMERCIAL

## 2024-12-17 VITALS
DIASTOLIC BLOOD PRESSURE: 79 MMHG | SYSTOLIC BLOOD PRESSURE: 122 MMHG | WEIGHT: 240 LBS | BODY MASS INDEX: 34.36 KG/M2 | OXYGEN SATURATION: 98 % | HEART RATE: 77 BPM | RESPIRATION RATE: 18 BRPM | HEIGHT: 70 IN

## 2024-12-17 DIAGNOSIS — J30.9 CHRONIC ALLERGIC RHINITIS: Chronic | ICD-10-CM

## 2024-12-17 DIAGNOSIS — Z12.5 SCREENING FOR PROSTATE CANCER: ICD-10-CM

## 2024-12-17 DIAGNOSIS — F41.1 GAD (GENERALIZED ANXIETY DISORDER): Chronic | ICD-10-CM

## 2024-12-17 DIAGNOSIS — F51.01 PRIMARY INSOMNIA: Chronic | ICD-10-CM

## 2024-12-17 DIAGNOSIS — E66.811 CLASS 1 OBESITY DUE TO EXCESS CALORIES WITH SERIOUS COMORBIDITY AND BODY MASS INDEX (BMI) OF 33.0 TO 33.9 IN ADULT: Chronic | ICD-10-CM

## 2024-12-17 DIAGNOSIS — E78.5 DYSLIPIDEMIA: Chronic | ICD-10-CM

## 2024-12-17 DIAGNOSIS — J45.40 MODERATE PERSISTENT EXTRINSIC ASTHMA WITHOUT COMPLICATION: Chronic | ICD-10-CM

## 2024-12-17 DIAGNOSIS — K21.9 GASTROESOPHAGEAL REFLUX DISEASE WITHOUT ESOPHAGITIS: Chronic | ICD-10-CM

## 2024-12-17 DIAGNOSIS — E66.09 CLASS 1 OBESITY DUE TO EXCESS CALORIES WITH SERIOUS COMORBIDITY AND BODY MASS INDEX (BMI) OF 33.0 TO 33.9 IN ADULT: Chronic | ICD-10-CM

## 2024-12-17 DIAGNOSIS — I10 PRIMARY HYPERTENSION: Primary | Chronic | ICD-10-CM

## 2024-12-17 DIAGNOSIS — R73.03 PREDIABETES: Chronic | ICD-10-CM

## 2024-12-17 PROCEDURE — 3008F BODY MASS INDEX DOCD: CPT | Mod: CPTII,,, | Performed by: GENERAL PRACTICE

## 2024-12-17 PROCEDURE — 1160F RVW MEDS BY RX/DR IN RCRD: CPT | Mod: CPTII,,, | Performed by: GENERAL PRACTICE

## 2024-12-17 PROCEDURE — 99396 PREV VISIT EST AGE 40-64: CPT | Mod: ,,, | Performed by: GENERAL PRACTICE

## 2024-12-17 PROCEDURE — 4010F ACE/ARB THERAPY RXD/TAKEN: CPT | Mod: CPTII,,, | Performed by: GENERAL PRACTICE

## 2024-12-17 PROCEDURE — 3044F HG A1C LEVEL LT 7.0%: CPT | Mod: CPTII,,, | Performed by: GENERAL PRACTICE

## 2024-12-17 PROCEDURE — 3074F SYST BP LT 130 MM HG: CPT | Mod: CPTII,,, | Performed by: GENERAL PRACTICE

## 2024-12-17 PROCEDURE — 3078F DIAST BP <80 MM HG: CPT | Mod: CPTII,,, | Performed by: GENERAL PRACTICE

## 2024-12-17 PROCEDURE — 1159F MED LIST DOCD IN RCRD: CPT | Mod: CPTII,,, | Performed by: GENERAL PRACTICE

## 2024-12-17 RX ORDER — AMLODIPINE AND BENAZEPRIL HYDROCHLORIDE 10; 20 MG/1; MG/1
1 CAPSULE ORAL DAILY
Qty: 90 CAPSULE | Refills: 3 | Status: SHIPPED | OUTPATIENT
Start: 2024-12-17 | End: 2024-12-17

## 2024-12-17 RX ORDER — AMLODIPINE AND BENAZEPRIL HYDROCHLORIDE 10; 20 MG/1; MG/1
1 CAPSULE ORAL DAILY
Qty: 90 CAPSULE | Refills: 3 | Status: SHIPPED | OUTPATIENT
Start: 2024-12-17 | End: 2025-12-17

## 2024-12-17 RX ORDER — LOVASTATIN 20 MG/1
20 TABLET ORAL DAILY
Qty: 90 TABLET | Refills: 3 | Status: SHIPPED | OUTPATIENT
Start: 2024-12-17 | End: 2025-12-17

## 2024-12-17 RX ORDER — LOVASTATIN 20 MG/1
20 TABLET ORAL DAILY
Qty: 90 TABLET | Refills: 3 | Status: SHIPPED | OUTPATIENT
Start: 2024-12-17 | End: 2024-12-17

## 2024-12-17 RX ORDER — DESVENLAFAXINE 50 MG/1
50 TABLET, FILM COATED, EXTENDED RELEASE ORAL DAILY
Qty: 30 TABLET | Refills: 11 | Status: SHIPPED | OUTPATIENT
Start: 2024-12-17 | End: 2025-12-17

## 2024-12-17 RX ORDER — DESVENLAFAXINE 50 MG/1
50 TABLET, FILM COATED, EXTENDED RELEASE ORAL DAILY
Qty: 30 TABLET | Refills: 11 | Status: SHIPPED | OUTPATIENT
Start: 2024-12-17 | End: 2024-12-17

## 2024-12-17 RX ORDER — DULOXETIN HYDROCHLORIDE 60 MG/1
60 CAPSULE, DELAYED RELEASE ORAL DAILY
Qty: 90 CAPSULE | Refills: 3 | Status: SHIPPED | OUTPATIENT
Start: 2024-12-17 | End: 2024-12-17

## 2025-06-17 ENCOUNTER — CLINICAL SUPPORT (OUTPATIENT)
Dept: PRIMARY CARE CLINIC | Facility: CLINIC | Age: 60
End: 2025-06-17
Payer: COMMERCIAL

## 2025-06-17 DIAGNOSIS — Z12.5 SCREENING FOR PROSTATE CANCER: ICD-10-CM

## 2025-06-17 DIAGNOSIS — E78.5 DYSLIPIDEMIA: Chronic | ICD-10-CM

## 2025-06-17 DIAGNOSIS — R73.03 PREDIABETES: Chronic | ICD-10-CM

## 2025-06-17 DIAGNOSIS — I10 PRIMARY HYPERTENSION: ICD-10-CM

## 2025-06-17 LAB
ALBUMIN SERPL-MCNC: 3.7 G/DL (ref 3.5–5)
ALBUMIN/GLOB SERPL: 1.2 RATIO (ref 1.1–2)
ALP SERPL-CCNC: 81 UNIT/L (ref 40–150)
ALT SERPL-CCNC: 23 UNIT/L (ref 0–55)
ANION GAP SERPL CALC-SCNC: 5 MEQ/L
AST SERPL-CCNC: 18 UNIT/L (ref 11–45)
BASOPHILS # BLD AUTO: 0.06 X10(3)/MCL
BASOPHILS NFR BLD AUTO: 0.8 %
BILIRUB SERPL-MCNC: 0.5 MG/DL
BUN SERPL-MCNC: 14.2 MG/DL (ref 8.4–25.7)
CALCIUM SERPL-MCNC: 8.8 MG/DL (ref 8.4–10.2)
CHLORIDE SERPL-SCNC: 108 MMOL/L (ref 98–107)
CHOLEST SERPL-MCNC: 147 MG/DL
CHOLEST/HDLC SERPL: 4 {RATIO} (ref 0–5)
CO2 SERPL-SCNC: 27 MMOL/L (ref 22–29)
CREAT SERPL-MCNC: 1.16 MG/DL (ref 0.72–1.25)
CREAT/UREA NIT SERPL: 12
EOSINOPHIL # BLD AUTO: 0.32 X10(3)/MCL (ref 0–0.9)
EOSINOPHIL NFR BLD AUTO: 4.2 %
ERYTHROCYTE [DISTWIDTH] IN BLOOD BY AUTOMATED COUNT: 12.2 % (ref 11.5–17)
EST. AVERAGE GLUCOSE BLD GHB EST-MCNC: 131.2 MG/DL
GFR SERPLBLD CREATININE-BSD FMLA CKD-EPI: >60 ML/MIN/1.73/M2
GLOBULIN SER-MCNC: 3 GM/DL (ref 2.4–3.5)
GLUCOSE SERPL-MCNC: 129 MG/DL (ref 74–100)
HBA1C MFR BLD: 6.2 %
HCT VFR BLD AUTO: 45.3 % (ref 42–52)
HDLC SERPL-MCNC: 42 MG/DL (ref 35–60)
HGB BLD-MCNC: 15.6 G/DL (ref 14–18)
IMM GRANULOCYTES # BLD AUTO: 0.03 X10(3)/MCL (ref 0–0.04)
IMM GRANULOCYTES NFR BLD AUTO: 0.4 %
LDLC SERPL CALC-MCNC: 77 MG/DL (ref 50–140)
LYMPHOCYTES # BLD AUTO: 2.08 X10(3)/MCL (ref 0.6–4.6)
LYMPHOCYTES NFR BLD AUTO: 27.3 %
MCH RBC QN AUTO: 31.6 PG (ref 27–31)
MCHC RBC AUTO-ENTMCNC: 34.4 G/DL (ref 33–36)
MCV RBC AUTO: 91.9 FL (ref 80–94)
MONOCYTES # BLD AUTO: 0.92 X10(3)/MCL (ref 0.1–1.3)
MONOCYTES NFR BLD AUTO: 12.1 %
NEUTROPHILS # BLD AUTO: 4.21 X10(3)/MCL (ref 2.1–9.2)
NEUTROPHILS NFR BLD AUTO: 55.2 %
NRBC BLD AUTO-RTO: 0 %
PLATELET # BLD AUTO: 321 X10(3)/MCL (ref 130–400)
PMV BLD AUTO: 10.7 FL (ref 7.4–10.4)
POTASSIUM SERPL-SCNC: 4.2 MMOL/L (ref 3.5–5.1)
PROT SERPL-MCNC: 6.7 GM/DL (ref 6.4–8.3)
PSA SERPL-MCNC: 0.95 NG/ML
RBC # BLD AUTO: 4.93 X10(6)/MCL (ref 4.7–6.1)
SODIUM SERPL-SCNC: 140 MMOL/L (ref 136–145)
TRIGL SERPL-MCNC: 142 MG/DL (ref 34–140)
TSH SERPL-ACNC: 2.13 UIU/ML (ref 0.35–4.94)
VLDLC SERPL CALC-MCNC: 28 MG/DL
WBC # BLD AUTO: 7.62 X10(3)/MCL (ref 4.5–11.5)

## 2025-06-17 PROCEDURE — 80061 LIPID PANEL: CPT | Performed by: GENERAL PRACTICE

## 2025-06-17 PROCEDURE — 83036 HEMOGLOBIN GLYCOSYLATED A1C: CPT | Performed by: GENERAL PRACTICE

## 2025-06-17 PROCEDURE — 36415 COLL VENOUS BLD VENIPUNCTURE: CPT | Mod: ,,, | Performed by: GENERAL PRACTICE

## 2025-06-17 PROCEDURE — 84443 ASSAY THYROID STIM HORMONE: CPT | Performed by: GENERAL PRACTICE

## 2025-06-17 PROCEDURE — 80053 COMPREHEN METABOLIC PANEL: CPT | Performed by: GENERAL PRACTICE

## 2025-06-17 PROCEDURE — 85025 COMPLETE CBC W/AUTO DIFF WBC: CPT | Performed by: GENERAL PRACTICE

## 2025-06-17 PROCEDURE — 84153 ASSAY OF PSA TOTAL: CPT | Performed by: GENERAL PRACTICE

## 2025-06-17 NOTE — PROGRESS NOTES
Patient presented for nurse visit/Blood draw. 23g needle X 1 attempt in left antecubital.  Patient tolerated procedure well.

## 2025-06-18 ENCOUNTER — RESULTS FOLLOW-UP (OUTPATIENT)
Dept: PRIMARY CARE CLINIC | Facility: CLINIC | Age: 60
End: 2025-06-18

## 2025-06-18 NOTE — ASSESSMENT & PLAN NOTE
Component  Ref Range & Units (hover) 1 d ago  (6/17/25) 1 yr ago  (6/14/24) 2 yr ago  (6/13/23) 2 yr ago  (12/7/22) 3 yr ago  (7/28/21) 3 yr ago  (7/28/21)   Hemoglobin A1c 6.2 5.7 6.0 High  R, CM 6.0 High  R, CM 5.6 R 5.6 R   Estimated Average Glucose 131.2 116.9

## 2025-06-18 NOTE — PROGRESS NOTES
"Subjective:       Patient ID: Prabhakar Duncan is a 59 y.o. male.    Chief Complaint: Annual Exam    Patient presents to the clinic today for wellness examination.  Current and past medical history reviewed  Pertinent family and social history reviewed    Colorectal cancer screening:  CRC screening reviewed  Prostate CA screening:  Prostate CA screening reviewed  Vaccinations due:  All vaccinations due and/or desired have been addressed and given.    No complaints today.        Review of Systems   Constitutional: Negative.  Negative for fatigue and fever.   HENT: Negative.  Negative for sore throat and trouble swallowing.    Eyes: Negative.  Negative for redness and visual disturbance.   Respiratory: Negative.  Negative for chest tightness and shortness of breath.    Cardiovascular: Negative.  Negative for chest pain.   Gastrointestinal: Negative.  Negative for abdominal pain, blood in stool and nausea.   Endocrine: Negative.  Negative for cold intolerance, heat intolerance and polyuria.   Genitourinary: Negative.    Musculoskeletal: Negative.  Negative for arthralgias, gait problem and joint swelling.   Integumentary:  Negative for rash. Negative.   Neurological: Negative.  Negative for dizziness, weakness and headaches.   Psychiatric/Behavioral: Negative.  Negative for agitation and dysphoric mood.            Patient Care Team:  Yohannes Hudson MD as PCP - General (Family Medicine)  Silvestre Morgan MD as Consulting Physician (Otolaryngology)  [facility], [facility] (Gastroenterology)  Objective:   Visit Vitals  /72   Pulse 75   Resp 18   Ht 5' 10" (1.778 m)   Wt 108 kg (238 lb)   SpO2 98%   BMI 34.15 kg/m²        Physical Exam  Constitutional:       Appearance: He is obese.   HENT:      Head: Normocephalic.      Mouth/Throat:      Mouth: Mucous membranes are moist.      Pharynx: Oropharynx is clear.   Eyes:      Pupils: Pupils are equal, round, and reactive to light.   Cardiovascular:    "   Rate and Rhythm: Normal rate and regular rhythm.   Pulmonary:      Effort: Pulmonary effort is normal.      Breath sounds: Normal breath sounds.   Abdominal:      Palpations: Abdomen is soft.   Musculoskeletal:         General: Normal range of motion.   Skin:     General: Skin is warm and dry.   Neurological:      General: No focal deficit present.      Mental Status: He is alert.   Psychiatric:         Mood and Affect: Mood normal.         Behavior: Behavior normal.         Thought Content: Thought content normal.         Judgment: Judgment normal.           Lab Results   Component Value Date     (H) 06/17/2025     06/17/2025    K 4.2 06/17/2025     (H) 06/17/2025    CO2 27 06/17/2025    BUN 14.2 06/17/2025    CREATININE 1.16 06/17/2025    CALCIUM 8.8 06/17/2025    PROT 6.7 06/17/2025    ALBUMIN 3.7 06/17/2025    BILITOT 0.5 06/17/2025    ALKPHOS 81 06/17/2025    AST 18 06/17/2025    ALT 23 06/17/2025    EGFRNORACEVR >60 06/17/2025     Lab Results   Component Value Date    WBC 7.62 06/17/2025    RBC 4.93 06/17/2025    HGB 15.6 06/17/2025    HCT 45.3 06/17/2025    MCV 91.9 06/17/2025    RDW 12.2 06/17/2025     06/17/2025      Lab Results   Component Value Date    CHOL 147 06/17/2025    TRIG 142 (H) 06/17/2025    HDL 42 06/17/2025    LDL 77.00 06/17/2025    TOTALCHOLEST 4 06/17/2025     Lab Results   Component Value Date    TSH 2.132 06/17/2025     Lab Results   Component Value Date    HGBA1C 6.2 06/17/2025        Lab Results   Component Value Date    PSA 0.95 06/17/2025         Assessment:       ICD-10-CM ICD-9-CM   1. Wellness examination  Z00.00 V70.0   2. Screening for prostate cancer  Z12.5 V76.44   3. Primary hypertension  I10 401.9   4. Prediabetes  R73.03 790.29   5. Dyslipidemia  E78.5 272.4   6. TUAN (generalized anxiety disorder)  F41.1 300.02   7. Chronic allergic rhinitis  J30.9 477.9   8. Moderate persistent extrinsic asthma without complication  J45.40 493.00   9.  Gastroesophageal reflux disease without esophagitis  K21.9 530.81   10. Primary insomnia  F51.01 307.42   11. Class 1 obesity due to excess calories with serious comorbidity and body mass index (BMI) of 33.0 to 33.9 in adult  E66.811 278.00    E66.09 V85.33    Z68.33        Current Outpatient Medications   Medication Instructions    amlodipine-benazepril 10-20mg (LOTREL) 10-20 mg per capsule 1 capsule, Oral, Daily    azelastine (ASTELIN) 137 mcg (0.1 %) nasal spray 1 spray, Daily    desvenlafaxine succinate (PRISTIQ) 100 mg, Oral, Daily    EPIPEN 0.3 mg/0.3 mL AtIn Once    esomeprazole (NEXIUM) 20 mg, Oral, Before breakfast    fexofenadine (ALLEGRA) 180 MG tablet 1 tablet, Daily    lovastatin (MEVACOR) 20 mg, Oral, Daily    multivit-minerals/folic acid (CENTRUM ADULT 50 PLUS ORAL) Take by mouth.    SYMBICORT 80-4.5 mcg/actuation HFAA 2 puffs, 2 times daily    traZODone (DESYREL) 100 mg, Oral, Nightly    VENTOLIN HFA 90 mcg/actuation inhaler 2 puffs, Every 6 hours PRN     Plan:     Problem List Items Addressed This Visit          Psychiatric    TUAN (generalized anxiety disorder) (Chronic)    Overview   Prescribed Pristiq 50 mg daily.    Switching from Cymbalta to Pristiq on 12/17/2024.    Patient having some breakthrough stress and anxiety, not sure if the Cymbalta is as effective as it once was, we will switch to Pristiq and recheck in about six weeks, January 2025.         Relevant Medications    desvenlafaxine succinate (PRISTIQ) 100 MG Tb24       ENT    Chronic allergic rhinitis (Chronic)    Overview   Prescribed Astelin nasal spray, takes Allegra.    Current condition is controlled on prescription medication, continue current treatment plan.    Medication will be continued at current dosage.            Pulmonary    Extrinsic asthma without complication (Chronic)    Overview   Prescribed Symbicort 80-4.5 mcg two puffs b.i.d. and albuterol MDI.    Patient is being followed and treated for asthma, being prescribed  medications for use related to this medical condition.  Patient does not report any significant worsening of the severity or duration of asthmatic events.    Patient advised to continue current medication(s) at current dose..            Cardiac/Vascular    Primary hypertension (Chronic)    Overview   Prescribed amlodipine benazepril 10-20 mg daily.    Blood pressures appear to be adequately controlled with current treatment plan, including prescription blood pressure medication.  Medication(s) appear to be effective at current dosages, and are not causing any side effects or problems.  Continue medical management and continue home blood pressure checks.  Average blood pressures at home should be no greater than 130/80.  Patient instructed to contact the clinic if blood pressures become elevated at any point prior to next clinic visit.    Medication will be continued at the current dosage.         Relevant Orders    Comprehensive Metabolic Panel    CBC Auto Differential    TSH    Dyslipidemia (Chronic)    Overview   Prescribed lovastatin 20 mg daily.    Most recent cholesterol/lipid blood testing shows adequate control, continue current treatment plan, including prescription medications if prescribed, and/or diet high in fiber, low in saturated fats as discussed during clinic visit.    Medication will be continued at current dosage.         Relevant Orders    Lipid Panel       Endocrine    Class 1 obesity due to excess calories with serious comorbidity and body mass index (BMI) of 33.0 to 33.9 in adult (Chronic)    Overview   Weight loss, healthy dietary choices, increased activity/exercise are recommended.  To lose weight, it is generally recommended to restrict calories to approximately 1500 calories per day to lose 1 lb per week, and approximately 1200 calories per day to lose up to 2 lb per week.  Generally, this is a healthy amount of weight to lose, and an appropriate amount of time to lose this amount of  "weight.  Adding exercise will assist in losing weight, particularly aerobic exercise such as walking.  However, resistance training, or lifting weights" over time may assistant weight loss by increasing basal metabolic rate, or the rate at which your body burns calories during periods of inactivity.    Keep track of BMI (body mass index), below 25 is considered normal, over 25 but below 30 is considered overweight, anything over 30 is considered moderately obese, over 35 severely obese, and over 40 is characterized as morbidly obese.         Prediabetes (Chronic)    Overview   Patient has prediabetes and is not prescribed medication.  Patient's prediabetes is treated and controlled adequately using conservative means, specifically lifestyle modification, dietary changes, and/or increase in activity/exercise.    Patient instructed to:  -Follow a healthy meal plan, including lean proteins, complex carbohydrates in moderation, fresh fruits and vegetables, low-fat dairy products, and healthy fats such as avocado and olive oil.  -Simple carbohydrates such as sweets should be avoided or consumed uncontrolled amounts  -Physical activity recommended, 30 minutes from more up to five days a week.  This could be brisk walking or biking.  -Try to lose weight, this would be beneficial in reversing the condition of prediabetes, and/or preventing progression to full-blown diabetes.         Current Assessment & Plan              Component  Ref Range & Units (hover) 1 d ago  (6/17/25) 1 yr ago  (6/14/24) 2 yr ago  (6/13/23) 2 yr ago  (12/7/22) 3 yr ago  (7/28/21) 3 yr ago  (7/28/21)   Hemoglobin A1c 6.2 5.7 6.0 High  R, CM 6.0 High  R, CM 5.6 R 5.6 R   Estimated Average Glucose 131.2 116.9                   Relevant Orders    Hemoglobin A1C       GI    Gastroesophageal reflux disease without esophagitis (Chronic)    Overview   Prescribed Nexium 40 mg daily.    Symptoms of GERD have been modified and controlled using a PPI " medication.  Patient was advised to continue reflux precautions as discussed in the past, and at some point would be prudent to discuss possible decreased frequency of PPI use or cessation of PPI use altogether.   It is hopeful at some point that the patient can use the medication on an as-needed basis.            Other    Primary insomnia (Chronic)    Overview   Prescribed trazodone 100 mg q.h.s..    This medical condition is currently stable on prescription medication, continue current treatment plan.          Other Visit Diagnoses         Wellness examination    -  Primary    Overall health status was reviewed.  Good health habits reinforced.  Annual wellness exams recommended.      Screening for prostate cancer        Prostate specific antigen blood test obtained was essentially normal, suggesting that there is no current concern for prostate cancer.  Digital exam deferred.    Relevant Orders    PSA, Screening                    Follow up in about 26 weeks (around 12/18/2025) for Chronic condition F/up and Wellness.    This note was created with the assistance of [employer] voice recognition software or phone dictation. There may be transcription errors as a result of using this technology. Effort has been made to assure accuracy of transcription but any obvious errors or omissions should be clarified with the author of the document.

## 2025-06-19 ENCOUNTER — OFFICE VISIT (OUTPATIENT)
Dept: PRIMARY CARE CLINIC | Facility: CLINIC | Age: 60
End: 2025-06-19
Payer: COMMERCIAL

## 2025-06-19 VITALS
WEIGHT: 238 LBS | SYSTOLIC BLOOD PRESSURE: 114 MMHG | HEART RATE: 75 BPM | RESPIRATION RATE: 18 BRPM | HEIGHT: 70 IN | BODY MASS INDEX: 34.07 KG/M2 | OXYGEN SATURATION: 98 % | DIASTOLIC BLOOD PRESSURE: 72 MMHG

## 2025-06-19 DIAGNOSIS — E66.811 CLASS 1 OBESITY DUE TO EXCESS CALORIES WITH SERIOUS COMORBIDITY AND BODY MASS INDEX (BMI) OF 33.0 TO 33.9 IN ADULT: Chronic | ICD-10-CM

## 2025-06-19 DIAGNOSIS — J30.9 CHRONIC ALLERGIC RHINITIS: Chronic | ICD-10-CM

## 2025-06-19 DIAGNOSIS — F51.01 PRIMARY INSOMNIA: Chronic | ICD-10-CM

## 2025-06-19 DIAGNOSIS — J45.40 MODERATE PERSISTENT EXTRINSIC ASTHMA WITHOUT COMPLICATION: Chronic | ICD-10-CM

## 2025-06-19 DIAGNOSIS — I10 PRIMARY HYPERTENSION: Chronic | ICD-10-CM

## 2025-06-19 DIAGNOSIS — Z12.5 SCREENING FOR PROSTATE CANCER: ICD-10-CM

## 2025-06-19 DIAGNOSIS — R73.03 PREDIABETES: Chronic | ICD-10-CM

## 2025-06-19 DIAGNOSIS — F41.1 GAD (GENERALIZED ANXIETY DISORDER): Chronic | ICD-10-CM

## 2025-06-19 DIAGNOSIS — E66.09 CLASS 1 OBESITY DUE TO EXCESS CALORIES WITH SERIOUS COMORBIDITY AND BODY MASS INDEX (BMI) OF 33.0 TO 33.9 IN ADULT: Chronic | ICD-10-CM

## 2025-06-19 DIAGNOSIS — Z00.00 WELLNESS EXAMINATION: Primary | ICD-10-CM

## 2025-06-19 DIAGNOSIS — K21.9 GASTROESOPHAGEAL REFLUX DISEASE WITHOUT ESOPHAGITIS: Chronic | ICD-10-CM

## 2025-06-19 DIAGNOSIS — E78.5 DYSLIPIDEMIA: Chronic | ICD-10-CM

## 2025-06-19 RX ORDER — DESVENLAFAXINE 100 MG/1
100 TABLET, EXTENDED RELEASE ORAL DAILY
Qty: 90 TABLET | Refills: 3 | Status: SHIPPED | OUTPATIENT
Start: 2025-06-19 | End: 2026-06-19

## 2025-07-23 ENCOUNTER — OFFICE VISIT (OUTPATIENT)
Dept: URGENT CARE | Facility: CLINIC | Age: 60
End: 2025-07-23
Payer: COMMERCIAL

## 2025-07-23 VITALS
DIASTOLIC BLOOD PRESSURE: 78 MMHG | TEMPERATURE: 98 F | HEIGHT: 70 IN | SYSTOLIC BLOOD PRESSURE: 116 MMHG | OXYGEN SATURATION: 97 % | BODY MASS INDEX: 34.36 KG/M2 | HEART RATE: 93 BPM | RESPIRATION RATE: 20 BRPM | WEIGHT: 240 LBS

## 2025-07-23 DIAGNOSIS — R05.9 COUGH, UNSPECIFIED TYPE: ICD-10-CM

## 2025-07-23 DIAGNOSIS — R09.81 SINUS CONGESTION: Primary | ICD-10-CM

## 2025-07-23 LAB
CTP QC/QA: YES
CTP QC/QA: YES
POC MOLECULAR INFLUENZA A AGN: NEGATIVE
POC MOLECULAR INFLUENZA B AGN: NEGATIVE
SARS-COV+SARS-COV-2 AG RESP QL IA.RAPID: NEGATIVE

## 2025-07-23 PROCEDURE — 87502 INFLUENZA DNA AMP PROBE: CPT | Mod: QW,,, | Performed by: CLINIC/CENTER

## 2025-07-23 PROCEDURE — 99213 OFFICE O/P EST LOW 20 MIN: CPT | Mod: 25,,, | Performed by: CLINIC/CENTER

## 2025-07-23 PROCEDURE — 96372 THER/PROPH/DIAG INJ SC/IM: CPT | Mod: ,,, | Performed by: CLINIC/CENTER

## 2025-07-23 PROCEDURE — 87811 SARS-COV-2 COVID19 W/OPTIC: CPT | Mod: QW,,, | Performed by: CLINIC/CENTER

## 2025-07-23 RX ORDER — BETAMETHASONE SODIUM PHOSPHATE AND BETAMETHASONE ACETATE 3; 3 MG/ML; MG/ML
9 INJECTION, SUSPENSION INTRA-ARTICULAR; INTRALESIONAL; INTRAMUSCULAR; SOFT TISSUE
Status: COMPLETED | OUTPATIENT
Start: 2025-07-23 | End: 2025-07-23

## 2025-07-23 RX ADMIN — BETAMETHASONE SODIUM PHOSPHATE AND BETAMETHASONE ACETATE 9 MG: 3; 3 INJECTION, SUSPENSION INTRA-ARTICULAR; INTRALESIONAL; INTRAMUSCULAR; SOFT TISSUE at 08:07

## 2025-07-23 NOTE — PROGRESS NOTES
"Subjective:      Patient ID: Prabhakar Duncan is a 59 y.o. male.    Vitals:  height is 5' 10" (1.778 m) and weight is 108.9 kg (240 lb). His tympanic temperature is 97.5 °F (36.4 °C). His blood pressure is 116/78 and his pulse is 93. His respiration is 20 and oxygen saturation is 97%.     Chief Complaint: Nasal Congestion and Sinus Problem     Patient is a 59 y.o. male who presents to urgent care with complaints of nasal congestion, sneezing x2 days. Alleviating factors include allegra D with no relief. Patient denies N/v/D HA BA fever.        HENT:  Positive for congestion, sinus pain and sinus pressure.       Objective:     Physical Exam   Constitutional: He is oriented to person, place, and time. He appears well-developed. He is cooperative.  Non-toxic appearance. He does not appear ill. No distress.   HENT:   Head: Normocephalic and atraumatic.   Ears:   Right Ear: Hearing, tympanic membrane, external ear and ear canal normal.   Left Ear: Hearing, tympanic membrane, external ear and ear canal normal.   Nose: Nose normal. No mucosal edema, rhinorrhea or nasal deformity. No epistaxis. Right sinus exhibits no maxillary sinus tenderness and no frontal sinus tenderness. Left sinus exhibits no maxillary sinus tenderness and no frontal sinus tenderness.   Mouth/Throat: Uvula is midline, oropharynx is clear and moist and mucous membranes are normal. No trismus in the jaw. Normal dentition. No uvula swelling. No oropharyngeal exudate, posterior oropharyngeal edema or posterior oropharyngeal erythema.   Eyes: Conjunctivae and lids are normal. No scleral icterus.   Neck: Trachea normal and phonation normal. Neck supple. No edema present. No erythema present. No neck rigidity present.   Cardiovascular: Normal rate, regular rhythm, normal heart sounds and normal pulses.   Pulmonary/Chest: Effort normal and breath sounds normal. No respiratory distress. He has no decreased breath sounds. He has no rhonchi.   Abdominal: Normal " "appearance.   Musculoskeletal: Normal range of motion.         General: No deformity. Normal range of motion.   Neurological: He is alert and oriented to person, place, and time. He exhibits normal muscle tone. Coordination normal.   Skin: Skin is warm, dry, intact, not diaphoretic and not pale.   Psychiatric: His speech is normal and behavior is normal. Judgment and thought content normal.   Nursing note and vitals reviewed.         Previous History      Review of patient's allergies indicates:   Allergen Reactions    Morphine Itching and Other (See Comments)     Severe headache       Past Medical History:   Diagnosis Date    Allergy     Asthma     GERD (gastroesophageal reflux disease)     Hyperlipidemia     Hypertension      Current Outpatient Medications   Medication Instructions    amlodipine-benazepril 10-20mg (LOTREL) 10-20 mg per capsule 1 capsule, Oral, Daily    azelastine (ASTELIN) 137 mcg (0.1 %) nasal spray 1 spray, Daily    desvenlafaxine succinate (PRISTIQ) 100 mg, Oral, Daily    EPIPEN 0.3 mg/0.3 mL AtIn Once    esomeprazole (NEXIUM) 20 mg, Oral, Before breakfast    fexofenadine (ALLEGRA) 180 MG tablet 1 tablet, Daily    lovastatin (MEVACOR) 20 mg, Oral, Daily    multivit-minerals/folic acid (CENTRUM ADULT 50 PLUS ORAL) Take by mouth.    SYMBICORT 80-4.5 mcg/actuation HFAA 2 puffs, 2 times daily    traZODone (DESYREL) 100 mg, Oral, Nightly    VENTOLIN HFA 90 mcg/actuation inhaler 2 puffs, Every 6 hours PRN     Past Surgical History:   Procedure Laterality Date    ADENOIDECTOMY      CARPAL TUNNEL RELEASE Left     CHOLECYSTECTOMY      COLONOSCOPY      ESOPHAGOGASTRODUODENOSCOPY      SINUS SURGERY      TONSILLECTOMY      TYMPANOSTOMY TUBE PLACEMENT       No family history on file.    Social History[1]     Physical Exam      Vital Signs Reviewed   /78   Pulse 93   Temp 97.5 °F (36.4 °C) (Tympanic)   Resp 20   Ht 5' 10" (1.778 m)   Wt 108.9 kg (240 lb)   SpO2 97%   BMI 34.44 kg/m²        " Procedures    Procedures     Labs     Results for orders placed or performed in visit on 07/23/25   SARS Coronavirus 2 Antigen, POCT Manual Read    Collection Time: 07/23/25  8:25 AM   Result Value Ref Range    SARS Coronavirus 2 Antigen Negative Negative, Presumptive Negative     Acceptable Yes    POCT Influenza A/B Molecular    Collection Time: 07/23/25  8:25 AM   Result Value Ref Range    POC Molecular Influenza A Ag Negative Negative    POC Molecular Influenza B Ag Negative Negative     Acceptable Yes       Assessment:     1. Sinus congestion    2. Cough, unspecified type      Pt's physical exam normal  Plan:   Drink plenty of fluids.     Get plenty of rest.     Tylenol or Motrin as needed.     Go to the ER with any significant change or worsening of symptoms.     Follow up with your primary care doctor.      Sinus congestion  -     betamethasone acetate-betamethasone sodium phosphate injection 9 mg    Cough, unspecified type  -     SARS Coronavirus 2 Antigen, POCT Manual Read  -     POCT Influenza A/B Molecular                         [1]   Social History  Tobacco Use    Smoking status: Never    Smokeless tobacco: Current    Tobacco comments:     Quit for 20 years     7 years ago

## 2025-08-29 DIAGNOSIS — F51.01 PRIMARY INSOMNIA: Primary | Chronic | ICD-10-CM

## 2025-08-29 RX ORDER — TRAZODONE HYDROCHLORIDE 100 MG/1
100 TABLET ORAL NIGHTLY
Qty: 90 TABLET | Refills: 3 | Status: SHIPPED | OUTPATIENT
Start: 2025-08-29 | End: 2026-08-29